# Patient Record
Sex: FEMALE | Race: WHITE | NOT HISPANIC OR LATINO | URBAN - METROPOLITAN AREA
[De-identification: names, ages, dates, MRNs, and addresses within clinical notes are randomized per-mention and may not be internally consistent; named-entity substitution may affect disease eponyms.]

---

## 2023-12-21 DIAGNOSIS — Z12.31 VISIT FOR SCREENING MAMMOGRAM: ICD-10-CM

## 2023-12-26 ENCOUNTER — ANCILLARY PROCEDURE (OUTPATIENT)
Dept: RADIOLOGY | Facility: CLINIC | Age: 53
End: 2023-12-26
Payer: COMMERCIAL

## 2023-12-26 DIAGNOSIS — Z12.31 VISIT FOR SCREENING MAMMOGRAM: ICD-10-CM

## 2023-12-26 PROCEDURE — 77067 SCR MAMMO BI INCL CAD: CPT

## 2023-12-26 PROCEDURE — 77067 SCR MAMMO BI INCL CAD: CPT | Performed by: RADIOLOGY

## 2023-12-26 PROCEDURE — 77063 BREAST TOMOSYNTHESIS BI: CPT | Performed by: RADIOLOGY

## 2024-06-28 ENCOUNTER — HOSPITAL ENCOUNTER (OUTPATIENT)
Dept: RADIOLOGY | Facility: HOSPITAL | Age: 54
Discharge: HOME | End: 2024-06-28
Payer: MEDICARE

## 2024-06-28 DIAGNOSIS — M25.522 LEFT ELBOW PAIN: ICD-10-CM

## 2024-06-28 PROCEDURE — 73080 X-RAY EXAM OF ELBOW: CPT | Mod: LT

## 2025-02-21 ENCOUNTER — OFFICE VISIT (OUTPATIENT)
Dept: URGENT CARE | Age: 55
End: 2025-02-21
Payer: MEDICARE

## 2025-02-21 VITALS
WEIGHT: 187 LBS | HEART RATE: 98 BPM | DIASTOLIC BLOOD PRESSURE: 90 MMHG | BODY MASS INDEX: 37.77 KG/M2 | TEMPERATURE: 97.6 F | OXYGEN SATURATION: 98 % | RESPIRATION RATE: 16 BRPM | SYSTOLIC BLOOD PRESSURE: 134 MMHG

## 2025-02-21 DIAGNOSIS — L03.011 INFECTION OF NAIL BED OF FINGER OF RIGHT HAND: ICD-10-CM

## 2025-02-21 DIAGNOSIS — M79.644 PAIN OF RIGHT THUMB: Primary | ICD-10-CM

## 2025-02-21 RX ORDER — DOXYCYCLINE 100 MG/1
100 CAPSULE ORAL 2 TIMES DAILY
Qty: 14 CAPSULE | Refills: 0 | Status: SHIPPED | OUTPATIENT
Start: 2025-02-21 | End: 2025-02-28

## 2025-02-21 RX ORDER — ALBUTEROL SULFATE 90 UG/1
INHALANT RESPIRATORY (INHALATION)
COMMUNITY

## 2025-02-21 RX ORDER — MUPIROCIN 20 MG/G
OINTMENT TOPICAL
Qty: 22 G | Refills: 0 | Status: SHIPPED | OUTPATIENT
Start: 2025-02-21 | End: 2025-03-03

## 2025-02-21 NOTE — PROGRESS NOTES
Subjective   Patient ID: Marilynn Culp is a 54 y.o. female. They present today with a chief complaint of Other (Right thumb - no injury just pain and swelling and tingling ).    History of Present Illness  HPI    Past Medical History  Allergies as of 02/21/2025 - Reviewed 02/21/2025   Allergen Reaction Noted    Metronidazole Diarrhea and Rash 02/21/2025    Amoxicillin Diarrhea 02/21/2025    Ciprofloxacin Diarrhea 02/21/2025    Naproxen Other 02/21/2025       (Not in a hospital admission)       Past Medical History:   Diagnosis Date    Personal history of other diseases of the respiratory system 01/22/2014    Personal history of asthma       Past Surgical History:   Procedure Laterality Date    APPENDECTOMY  01/22/2014    Appendectomy    CHOLECYSTECTOMY  01/22/2014    Cholecystectomy    COLONOSCOPY  01/22/2014    Colonoscopy (Fiberoptic)    HYSTERECTOMY  01/22/2014    Hysterectomy    INCISIONAL BREAST BIOPSY  01/25/2021    Incisional Breast Biopsy        reports that she has never smoked. She has never used smokeless tobacco.    Review of Systems  Review of Systems                               Objective    Vitals:    02/21/25 1204 02/21/25 1212   BP: (!) 154/115 134/90   Pulse: 98    Resp: 16    Temp: 36.4 °C (97.6 °F)    SpO2: 98%    Weight: 84.8 kg (187 lb)      No LMP recorded. Patient has had a hysterectomy.    Physical Exam  Vitals and nursing note reviewed.   Constitutional:       Appearance: Normal appearance.   HENT:      Head: Normocephalic.      Nose: Nose normal.      Mouth/Throat:      Mouth: Mucous membranes are dry.      Pharynx: Oropharynx is clear.   Eyes:      Extraocular Movements: Extraocular movements intact.      Pupils: Pupils are equal, round, and reactive to light.   Cardiovascular:      Rate and Rhythm: Normal rate and regular rhythm.      Pulses: Normal pulses.      Heart sounds: Normal heart sounds.   Pulmonary:      Effort: Pulmonary effort is normal.      Breath sounds: Normal breath  sounds.   Musculoskeletal:         General: Normal range of motion.      Cervical back: Normal range of motion and neck supple.   Skin:     General: Skin is warm and dry.      Comments: Left thumb with extreme tenderness with erythema and warmth distally on ventral side, no d/c noted around nail matrix or eponychium    Neurological:      General: No focal deficit present.      Mental Status: She is alert and oriented to person, place, and time.   Psychiatric:         Mood and Affect: Mood normal.         Behavior: Behavior normal.         Procedures    Point of Care Test & Imaging Results from this visit  No results found for this visit on 02/21/25.   No results found.    Diagnostic study results (if any) were reviewed by CONCHITA Linares.    Assessment/Plan   Allergies, medications, history, and pertinent labs/EKGs/Imaging reviewed by CONCHITA Linares.     Medical Decision Making      53 y/o F who c/o right thumb sensitivity and swelling for 3 days. Pt gets her nails done quite frequently, gels and they file her nails pretty low and she believes the one  did more on her right thumb. Denies fever, chills, SOB, CP, decreased ROM  Elevated BP discussed, no   Per PE, no indication for I&D since no area to drain, but dorsal aspect of right thumb extremely tender in which difficult performing tests  Doxycycline sent to cover for possible onset of paronychia  Advised epsom salt soaks x 4 daily  Avoid gels for at least 2-3 months  Mupirocin BID x 10 days  Follow up with PCP within 1 - 2 weeks, but if signs and symptoms worsen or have signs and symptoms of infection go to your nearest ER       Follow up Care: Pt instructed to follow-up with PCP or other appropriate clinician within 24 to 48 hours. Report to ED if there is any development in worsening pain, difficulty swallowing, change in phonation, fever, chills, neck pain, photophobia, headache, neck stiffness, chest pain, abdominal pain,  vomiting, syncope, hemoptysis, leg swelling SOB, fever, facial swelling, eye pain, periorbital swelling/erythema, or any new signs or sx.     The patient was educated regarding diagnosis, supportive care, OTC and Rx medications. The patient was given the opportunity to ask questions prior to discharge. They verbalized understanding of my discussion of the plans for treatment, expected course, indications to return to  or seek further evaluation in ED, and the need for timely follow up as directed.       Appy mupirocin 2-3 times daily and then cover area   DO NOT pick area or attempt to pop    Take your antibiotics as directed. Do not stop taking them just because you feel better. You need to take the full course of antibiotics.     Prop up the infected area on pillows to reduce pain and swelling. Try to keep the area above the level of your heart as often as you can.     If your doctor told you how to care for your infection, follow your doctor's instructions. If you did not get instructions, follow this general advice:     Wash the area with clean water 3 times a day. Don't use hydrogen peroxide or alcohol, which can slow healing.     You may cover the area with a thin layer of petroleum jelly, such as Vaseline, and a non-stick bandage.     Apply more petroleum jelly and replace the bandage as needed.     Be safe with medicines. Take pain medicines exactly as directed.     If the doctor gave you a prescription medicine for pain, take it as prescribed.     If you are not taking a prescription pain medicine, ask your doctor if you can take an over-the-counter medicine.       What can I do to feel better?      Keep the infected area clean and dry      As often as possible, raise the infected area above the level of your heart.      This helps decrease swelling. Try to do this for 30 minutes at least 4 times a      day.          When can I expect my infection to look better?     Typically, it will look better in  48-72 hours. It is not unusual for the area of     redness to be bigger (and extend outside of marked lines) over the first 48      hours, but soon the area will become less bright and begin to fade. After 48      hours, the size of the area of redness should be receding as well. The changes      on the skin can last for a few weeks, long after the infection has been      adequately treated, so your skin may not look normal at 5 days. But, if you still      have pain, heat, or bright red skin at day 5, you should call your doctor.      When should I call my doctor?        Call your doctor if these occur:      Nausea      Vomiting       Diarrhea       Rash       New drainage from the infection       New fever of 100.4 degrees Fahrenheit (38 degrees Celsius) or higher      Worsening pain in the infected area        Orders and Diagnoses  Diagnoses and all orders for this visit:  Pain of right thumb  Infection of nail bed of finger of right hand  -     mupirocin (Bactroban) 2 % ointment; Apply topically 3 times a day for 10 days.  -     doxycycline (Vibramycin) 100 mg capsule; Take 1 capsule (100 mg) by mouth 2 times a day for 7 days. Take with at least 8 ounces (large glass) of water, do not lie down for 30 minutes after      Medical Admin Record      Patient disposition: Home    Electronically signed by CONCHITA Linares  12:52 PM

## 2025-06-20 ENCOUNTER — APPOINTMENT (OUTPATIENT)
Dept: RADIOLOGY | Facility: HOSPITAL | Age: 55
End: 2025-06-20
Payer: MEDICARE

## 2025-06-20 ENCOUNTER — HOSPITAL ENCOUNTER (EMERGENCY)
Facility: HOSPITAL | Age: 55
Discharge: HOME | End: 2025-06-20
Payer: MEDICARE

## 2025-06-20 VITALS
HEART RATE: 75 BPM | SYSTOLIC BLOOD PRESSURE: 129 MMHG | DIASTOLIC BLOOD PRESSURE: 93 MMHG | RESPIRATION RATE: 18 BRPM | WEIGHT: 188 LBS | OXYGEN SATURATION: 98 % | HEIGHT: 58 IN | BODY MASS INDEX: 39.47 KG/M2 | TEMPERATURE: 99.3 F

## 2025-06-20 DIAGNOSIS — M54.2 NECK PAIN: ICD-10-CM

## 2025-06-20 DIAGNOSIS — S09.90XA HEAD INJURY, INITIAL ENCOUNTER: Primary | ICD-10-CM

## 2025-06-20 PROCEDURE — 72125 CT NECK SPINE W/O DYE: CPT

## 2025-06-20 PROCEDURE — 2500000001 HC RX 250 WO HCPCS SELF ADMINISTERED DRUGS (ALT 637 FOR MEDICARE OP): Performed by: NURSE PRACTITIONER

## 2025-06-20 PROCEDURE — 71046 X-RAY EXAM CHEST 2 VIEWS: CPT | Performed by: RADIOLOGY

## 2025-06-20 PROCEDURE — 72125 CT NECK SPINE W/O DYE: CPT | Performed by: RADIOLOGY

## 2025-06-20 PROCEDURE — 70450 CT HEAD/BRAIN W/O DYE: CPT

## 2025-06-20 PROCEDURE — 2500000005 HC RX 250 GENERAL PHARMACY W/O HCPCS: Performed by: NURSE PRACTITIONER

## 2025-06-20 PROCEDURE — 96372 THER/PROPH/DIAG INJ SC/IM: CPT | Performed by: NURSE PRACTITIONER

## 2025-06-20 PROCEDURE — 70450 CT HEAD/BRAIN W/O DYE: CPT | Performed by: RADIOLOGY

## 2025-06-20 PROCEDURE — 71046 X-RAY EXAM CHEST 2 VIEWS: CPT

## 2025-06-20 PROCEDURE — 99284 EMERGENCY DEPT VISIT MOD MDM: CPT | Mod: 25

## 2025-06-20 PROCEDURE — 2500000004 HC RX 250 GENERAL PHARMACY W/ HCPCS (ALT 636 FOR OP/ED): Mod: JZ | Performed by: NURSE PRACTITIONER

## 2025-06-20 RX ORDER — LIDOCAINE 560 MG/1
1 PATCH PERCUTANEOUS; TOPICAL; TRANSDERMAL ONCE
Status: DISCONTINUED | OUTPATIENT
Start: 2025-06-20 | End: 2025-06-20 | Stop reason: HOSPADM

## 2025-06-20 RX ORDER — KETOROLAC TROMETHAMINE 30 MG/ML
30 INJECTION, SOLUTION INTRAMUSCULAR; INTRAVENOUS ONCE
Status: COMPLETED | OUTPATIENT
Start: 2025-06-20 | End: 2025-06-20

## 2025-06-20 RX ORDER — KETOROLAC TROMETHAMINE 10 MG/1
10 TABLET, FILM COATED ORAL EVERY 8 HOURS PRN
Qty: 10 TABLET | Refills: 0 | Status: SHIPPED | OUTPATIENT
Start: 2025-06-20

## 2025-06-20 RX ORDER — LIDOCAINE 50 MG/G
1 PATCH TOPICAL DAILY PRN
Qty: 10 PATCH | Refills: 0 | Status: SHIPPED | OUTPATIENT
Start: 2025-06-20 | End: 2025-06-30

## 2025-06-20 RX ORDER — METHOCARBAMOL 500 MG/1
500 TABLET, FILM COATED ORAL ONCE
Status: COMPLETED | OUTPATIENT
Start: 2025-06-20 | End: 2025-06-20

## 2025-06-20 RX ORDER — METHOCARBAMOL 500 MG/1
500 TABLET, FILM COATED ORAL 3 TIMES DAILY PRN
Qty: 15 TABLET | Refills: 0 | Status: SHIPPED | OUTPATIENT
Start: 2025-06-20 | End: 2025-06-25

## 2025-06-20 RX ADMIN — LIDOCAINE 1 PATCH: 4 PATCH TOPICAL at 14:57

## 2025-06-20 RX ADMIN — KETOROLAC TROMETHAMINE 30 MG: 30 INJECTION, SOLUTION INTRAMUSCULAR at 14:55

## 2025-06-20 RX ADMIN — METHOCARBAMOL 500 MG: 500 TABLET ORAL at 14:52

## 2025-06-20 ASSESSMENT — LIFESTYLE VARIABLES
HAVE PEOPLE ANNOYED YOU BY CRITICIZING YOUR DRINKING: NO
EVER HAD A DRINK FIRST THING IN THE MORNING TO STEADY YOUR NERVES TO GET RID OF A HANGOVER: NO
TOTAL SCORE: 0
HAVE YOU EVER FELT YOU SHOULD CUT DOWN ON YOUR DRINKING: NO
EVER FELT BAD OR GUILTY ABOUT YOUR DRINKING: NO

## 2025-06-20 ASSESSMENT — PAIN DESCRIPTION - LOCATION: LOCATION: HEAD

## 2025-06-20 ASSESSMENT — PAIN SCALES - GENERAL: PAINLEVEL_OUTOF10: 6

## 2025-06-20 ASSESSMENT — PAIN DESCRIPTION - FREQUENCY: FREQUENCY: CONSTANT/CONTINUOUS

## 2025-06-20 ASSESSMENT — PAIN - FUNCTIONAL ASSESSMENT: PAIN_FUNCTIONAL_ASSESSMENT: 0-10

## 2025-06-20 NOTE — ED PROVIDER NOTES
HPI   Chief Complaint   Patient presents with    Fall     Pt states she was pulled down by her sons dog. Pt hit back of head, upper back, and left shoulder in grass. Pt denies any loc or blood thinners,.        HPI  See my MDM      Patient History   Medical History[1]  Surgical History[2]  Family History[3]  Social History[4]    Physical Exam   ED Triage Vitals [06/20/25 1410]   Temperature Heart Rate Respirations BP   37.4 °C (99.3 °F) (!) 115 18 (!) 154/102      Pulse Ox Temp Source Heart Rate Source Patient Position   94 % Temporal -- Sitting      BP Location FiO2 (%)     Left arm --       Physical Exam  CONSTITUTIONAL: Vital signs reviewed as charted, well-developed and in no distress  Eyes: Extraocular muscles are intact. Pupils equal round and reactive to light. Conjunctiva are pink.    ENT: Mucous membranes are moist. Tongue in the midline. Pharynx was without erythema or exudates, uvula midline  LUNGS: Breath sounds equal and clear to auscultation. Good air exchange, no wheezes rales or retractions, pulse oximetry is charted.  HEART: Regular rate and rhythm without murmur thrill or rub, strong tones, auscultation is normal.  ABDOMEN: Soft and nontender without guarding rebound rigidity or mass. Bowel sounds are present and normal in all quadrants. There is no palpable masses or aneurysms identified. No hepatosplenomegaly, normal abdominal exam.  Neuro: The patient is awake, alert and oriented ×3. Moving all 4 extremities and answering questions appropriately.   MUSCULOSKELETAL: No midline tenderness of cervical thoracic or lumbar spine.  Does have tenderness at the left base of the skull and active spasm in the left trapezius.  Upper extremity strength graded 5 equal bilaterally.  Sensation and pulse are intact.  Deep tendon flexes present bilaterally.  There is no saddle anesthesia.  PSYCH: Awake alert oriented, normal mood and affect.  Skin:  Dry, normal color, warm to the touch, no rash present.        ED  Course & MDM   Diagnoses as of 06/20/25 1724   Head injury, initial encounter   Neck pain                 No data recorded     [place] Coma Scale Score: 15 (06/20/25 1408 : Luz Presley RN)                           Medical Decision Making  History obtained from: patient    Vital signs, nursing notes, current medications, past medical history, Surgical history, allergies, social history, family History were reviewed.         HPI:  Patient 54-year-old female present emergency room today for evaluation of injury sustained when she got pulled down by her child's dog.  She fell over backwards 2 days ago planes of pain at the base of the skull and in the left upper back.  Little bit of pain across the anterior chest as well.  Denies taking anticoagulants or loss of conscious.  Denies dizziness, chest pain, shortness of breath, abdominal pain or extremity edema.  Nontoxic well-appearing      10 point ROS was reviewed and negative except Noted above in HPI.  DDX: as listed above          MDM Summary/considerations:  Labs Reviewed - No data to display  CT cervical spine wo IV contrast   Final Result   No evidence for an acute fracture or subluxation of the cervical   spine. Multilevel discogenic degenerative changes.        MACRO:   None        Signed by: Frankie Roque 6/20/2025 3:41 PM   Dictation workstation:   [id number]      CT head wo IV contrast   Final Result   No evidence of acute cortical infarct or intracranial hemorrhage.        No evidence of intracranial hemorrhage or displaced skull fracture.        MACRO:   None        Signed by: Frankie Roque 6/20/2025 3:20 PM   Dictation workstation:   [id number]      XR chest 2 views   Final Result   1.  No evidence of acute cardiopulmonary process.                  MACRO:   None        Signed by: Bertin Grant 6/20/2025 3:21 PM   Dictation workstation:   [id number]        Medications   lidocaine 4 % patch 1 patch (1 patch transdermal Medication Applied 6/20/25  1457)   ketorolac (Toradol) injection 30 mg (30 mg intramuscular Given 6/20/25 1455)   methocarbamol (Robaxin) tablet 500 mg (500 mg oral Given 6/20/25 1452)     Discharge Medication List as of 6/20/2025  3:55 PM        START taking these medications    Details   ketorolac (Toradol) 10 mg tablet Take 1 tablet (10 mg) by mouth every 8 hours if needed for moderate pain (4 - 6)., Starting Fri 6/20/2025, Normal      lidocaine (Lidoderm) 5 % patch Place 1 patch over 12 hours on the skin once daily as needed for mild pain (1 - 3) for up to 10 days. Remove & discard patch within 12 hours or as directed by MD., Starting Fri 6/20/2025, Until Mon 6/30/2025 at 2359, Normal      methocarbamol (Robaxin) 500 mg tablet Take 1 tablet (500 mg) by mouth 3 times a day as needed for muscle spasms for up to 5 days., Starting Fri 6/20/2025, Until Wed 6/25/2025 at 2359, Normal           I estimate there is a low risk for subarachnoid hemorrhage, cavernous sinus venous thrombosis, meningitis, intracranial hemorrhage, subdural hematoma, or stroke, thus I considered the discharge disposition reasonable. We have discussed the diagnosis and risks, and we agreed with discharging home to follow-up with her primary care doctor. We also discussed returning to the emergency department immediately if new or worsening symptoms occur. We have discussed the symptoms which are most concerning such as changing or worsening pain, weakness, vomiting, or fever and necessitate immediate return.      CT scan of the head and neck is negative for acute process, chest x-ray shows no acute pathology.  Patient was feeling better after meds here in the ED tachycardia resolved.  Was discharged home stable condition will follow PCP for reevaluation.    All of the patient's questions were answered to the best of my ability.  Patient states understanding that they have been screened for an emergency today and we have not found any etiology of symptoms that requires  emergent treatment or admission to the hospital at this point. They understand that they have not had definitive care day and require follow-up for treatment of their condition. They also state understanding that they may have an emergent condition that may potentially have not of detected at this visit and they must return to the emergency department if they develop any worsening of symptoms or new complaints.      I have evaluated this patient, my supervising physician was available for consultation.              Critical Care: Not warranted at this time        This chart was completed using voice recognition transcription software. Please excuse any errors of transcription including grammatical, punctuation, syntax and spelling errors.  Please contact me with any questions regarding this chart.    Procedure  Procedures       [1]   Past Medical History:  Diagnosis Date    Personal history of other diseases of the respiratory system 01/22/2014    Personal history of asthma   [2]   Past Surgical History:  Procedure Laterality Date    APPENDECTOMY  01/22/2014    Appendectomy    CHOLECYSTECTOMY  01/22/2014    Cholecystectomy    COLONOSCOPY  01/22/2014    Colonoscopy (Fiberoptic)    HYSTERECTOMY  01/22/2014    Hysterectomy    INCISIONAL BREAST BIOPSY  01/25/2021    Incisional Breast Biopsy   [3] No family history on file.  [4]   Social History  Tobacco Use    Smoking status: Never    Smokeless tobacco: Never   Substance Use Topics    Alcohol use: Not on file    Drug use: Not on file        Juan M Sharif, VLAD-CNP  06/20/25 6621

## 2025-06-20 NOTE — DISCHARGE INSTRUCTIONS
Please return to the emergency room immediately if you have new or worsening symptoms. Symptoms of concern include changing or worsening pain, weakness, vomiting, or fever did necessitate immediate return.    Thank you for allowing us to take care of you today. While you are home, you might receive a survey about your care in our hospital. Your nurse and myself, Juan M Sharif CNP, would love your honest feedback on your care. Your feedback and especially your positive comments help our hospital receive the support we need to continue to serve you and your family. Thank you again for trusting us with your care.